# Patient Record
Sex: MALE | Race: WHITE | Employment: FULL TIME | URBAN - METROPOLITAN AREA
[De-identification: names, ages, dates, MRNs, and addresses within clinical notes are randomized per-mention and may not be internally consistent; named-entity substitution may affect disease eponyms.]

---

## 2023-02-01 ENCOUNTER — LAB ENCOUNTER (OUTPATIENT)
Dept: LAB | Age: 38
End: 2023-02-01
Attending: STUDENT IN AN ORGANIZED HEALTH CARE EDUCATION/TRAINING PROGRAM
Payer: COMMERCIAL

## 2023-02-01 ENCOUNTER — OFFICE VISIT (OUTPATIENT)
Dept: FAMILY MEDICINE CLINIC | Facility: CLINIC | Age: 38
End: 2023-02-01

## 2023-02-01 ENCOUNTER — LAB ENCOUNTER (OUTPATIENT)
Dept: LAB | Facility: HOSPITAL | Age: 38
End: 2023-02-01
Attending: STUDENT IN AN ORGANIZED HEALTH CARE EDUCATION/TRAINING PROGRAM
Payer: COMMERCIAL

## 2023-02-01 VITALS
BODY MASS INDEX: 23.48 KG/M2 | WEIGHT: 164 LBS | DIASTOLIC BLOOD PRESSURE: 71 MMHG | SYSTOLIC BLOOD PRESSURE: 130 MMHG | HEIGHT: 70 IN | HEART RATE: 87 BPM

## 2023-02-01 DIAGNOSIS — R07.89 LEFT CHEST PRESSURE: ICD-10-CM

## 2023-02-01 DIAGNOSIS — L98.9 SKIN LESION OF SCALP: ICD-10-CM

## 2023-02-01 DIAGNOSIS — Z00.00 WELL ADULT EXAM: ICD-10-CM

## 2023-02-01 DIAGNOSIS — Z00.00 WELL ADULT EXAM: Primary | ICD-10-CM

## 2023-02-01 LAB
ALBUMIN SERPL-MCNC: 4.5 G/DL (ref 3.4–5)
ALBUMIN/GLOB SERPL: 1.5 {RATIO} (ref 1–2)
ALP LIVER SERPL-CCNC: 58 U/L
ALT SERPL-CCNC: 31 U/L
ANION GAP SERPL CALC-SCNC: 2 MMOL/L (ref 0–18)
AST SERPL-CCNC: 15 U/L (ref 15–37)
ATRIAL RATE: 60 BPM
BILIRUB SERPL-MCNC: 1 MG/DL (ref 0.1–2)
BILIRUB UR QL: NEGATIVE
BUN BLD-MCNC: 12 MG/DL (ref 7–18)
BUN/CREAT SERPL: 12.2 (ref 10–20)
CALCIUM BLD-MCNC: 10.1 MG/DL (ref 8.5–10.1)
CHLORIDE SERPL-SCNC: 104 MMOL/L (ref 98–112)
CHOLEST SERPL-MCNC: 226 MG/DL (ref ?–200)
CLARITY UR: CLEAR
CO2 SERPL-SCNC: 32 MMOL/L (ref 21–32)
COLOR UR: YELLOW
CREAT BLD-MCNC: 0.98 MG/DL
DEPRECATED RDW RBC AUTO: 39.8 FL (ref 35.1–46.3)
ERYTHROCYTE [DISTWIDTH] IN BLOOD BY AUTOMATED COUNT: 12 % (ref 11–15)
EST. AVERAGE GLUCOSE BLD GHB EST-MCNC: 97 MG/DL (ref 68–126)
FASTING PATIENT LIPID ANSWER: YES
FASTING STATUS PATIENT QL REPORTED: YES
GFR SERPLBLD BASED ON 1.73 SQ M-ARVRAT: 103 ML/MIN/1.73M2 (ref 60–?)
GLOBULIN PLAS-MCNC: 3.1 G/DL (ref 2.8–4.4)
GLUCOSE BLD-MCNC: 91 MG/DL (ref 70–99)
GLUCOSE UR-MCNC: NEGATIVE MG/DL
HBA1C MFR BLD: 5 % (ref ?–5.7)
HCT VFR BLD AUTO: 45.7 %
HDLC SERPL-MCNC: 65 MG/DL (ref 40–59)
HGB BLD-MCNC: 15.5 G/DL
HGB UR QL STRIP.AUTO: NEGATIVE
KETONES UR-MCNC: NEGATIVE MG/DL
LDLC SERPL CALC-MCNC: 153 MG/DL (ref ?–100)
LEUKOCYTE ESTERASE UR QL STRIP.AUTO: NEGATIVE
MCH RBC QN AUTO: 30.8 PG (ref 26–34)
MCHC RBC AUTO-ENTMCNC: 33.9 G/DL (ref 31–37)
MCV RBC AUTO: 90.7 FL
NITRITE UR QL STRIP.AUTO: NEGATIVE
NONHDLC SERPL-MCNC: 161 MG/DL (ref ?–130)
OSMOLALITY SERPL CALC.SUM OF ELEC: 285 MOSM/KG (ref 275–295)
P AXIS: 47 DEGREES
P-R INTERVAL: 156 MS
PH UR: 7.5 [PH] (ref 5–8)
PLATELET # BLD AUTO: 190 10(3)UL (ref 150–450)
POTASSIUM SERPL-SCNC: 4.6 MMOL/L (ref 3.5–5.1)
PROT SERPL-MCNC: 7.6 G/DL (ref 6.4–8.2)
PROT UR-MCNC: NEGATIVE MG/DL
Q-T INTERVAL: 388 MS
QRS DURATION: 108 MS
QTC CALCULATION (BEZET): 388 MS
R AXIS: 83 DEGREES
RBC # BLD AUTO: 5.04 X10(6)UL
SODIUM SERPL-SCNC: 138 MMOL/L (ref 136–145)
SP GR UR STRIP: 1.01 (ref 1–1.03)
T AXIS: 70 DEGREES
TRIGL SERPL-MCNC: 48 MG/DL (ref 30–149)
TSI SER-ACNC: 1.06 MIU/ML (ref 0.36–3.74)
UROBILINOGEN UR STRIP-ACNC: 0.2
VENTRICULAR RATE: 60 BPM
VIT D+METAB SERPL-MCNC: 24.7 NG/ML (ref 30–100)
VLDLC SERPL CALC-MCNC: 9 MG/DL (ref 0–30)
WBC # BLD AUTO: 2.7 X10(3) UL (ref 4–11)

## 2023-02-01 PROCEDURE — 3075F SYST BP GE 130 - 139MM HG: CPT | Performed by: STUDENT IN AN ORGANIZED HEALTH CARE EDUCATION/TRAINING PROGRAM

## 2023-02-01 PROCEDURE — 84443 ASSAY THYROID STIM HORMONE: CPT

## 2023-02-01 PROCEDURE — 83036 HEMOGLOBIN GLYCOSYLATED A1C: CPT

## 2023-02-01 PROCEDURE — 81003 URINALYSIS AUTO W/O SCOPE: CPT

## 2023-02-01 PROCEDURE — 93005 ELECTROCARDIOGRAM TRACING: CPT

## 2023-02-01 PROCEDURE — 3078F DIAST BP <80 MM HG: CPT | Performed by: STUDENT IN AN ORGANIZED HEALTH CARE EDUCATION/TRAINING PROGRAM

## 2023-02-01 PROCEDURE — 99385 PREV VISIT NEW AGE 18-39: CPT | Performed by: STUDENT IN AN ORGANIZED HEALTH CARE EDUCATION/TRAINING PROGRAM

## 2023-02-01 PROCEDURE — 82306 VITAMIN D 25 HYDROXY: CPT

## 2023-02-01 PROCEDURE — 85027 COMPLETE CBC AUTOMATED: CPT

## 2023-02-01 PROCEDURE — 80061 LIPID PANEL: CPT

## 2023-02-01 PROCEDURE — 36415 COLL VENOUS BLD VENIPUNCTURE: CPT

## 2023-02-01 PROCEDURE — 3008F BODY MASS INDEX DOCD: CPT | Performed by: STUDENT IN AN ORGANIZED HEALTH CARE EDUCATION/TRAINING PROGRAM

## 2023-02-01 PROCEDURE — 93010 ELECTROCARDIOGRAM REPORT: CPT | Performed by: INTERNAL MEDICINE

## 2023-02-01 PROCEDURE — 80053 COMPREHEN METABOLIC PANEL: CPT

## 2023-02-14 ENCOUNTER — OFFICE VISIT (OUTPATIENT)
Dept: DERMATOLOGY CLINIC | Facility: CLINIC | Age: 38
End: 2023-02-14

## 2023-02-14 DIAGNOSIS — B07.9 VERRUCA VULGARIS: Primary | ICD-10-CM

## 2023-02-14 RX ORDER — CLOBETASOL PROPIONATE 0.5 MG/G
CREAM TOPICAL
Qty: 30 G | Refills: 2 | Status: SHIPPED | OUTPATIENT
Start: 2023-02-14

## 2023-02-14 NOTE — PROGRESS NOTES
New Patient    Referred by: No referring provider defined for this encounter. CHIEF COMPLAINT: Lesion of concern     HISTORY OF PRESENT ILLNESS: Ramy Conway is a 30year old male here for evaluation of lesion of concern. 1. Growth   Location: Scalp  Duration: Unknown  Signs and symptoms: Hard, raised and scaly  Current treatment: None  Past treatments: None    2. Growth   Location: Upper right thigh  Duration: Seasonal  Signs and symptoms: Itchy  Current treatment: None  Past treatments: Tpoical    Personal Dermatologic History  History of skin cancer: No  History of  atypical moles: No    FAMILY HISTORY:  History of melanoma: No    Past Medical History  No past medical history on file. REVIEW OF SYSTEMS:  Constitutional: Denies fever, chills, unintentional weight loss. Skin as per HPI    Medications  No current outpatient medications on file. PHYSICAL EXAM:  General: awake, alert, no acute distress  Neuropsych: appropriate mood and affect  Eyes: Sclerae anicteric, without conjunctival injection, eyelids unremarkable  Skin: Skin exam was performed today including the following: scalp and right thigh . Pertinent findings include:   - Right thigh with a pink scaly plaques  - Right temple with a verrucous pink papule     ASSESSMENT & PLAN:  Pathophysiology of diagnoses discussed with patient. Therapeutic options reviewed. Risks, benefits, and alternatives discussed with patient. Instructions reviewed at length. #Verruca Vulgaris   - Cryotherapy today. Medically necessary as lesion inflamed and irritated. - Cryosurgery of non-malignant lesion(s)  - Risks, benefits, alternatives and personnel required for cryosurgery reviewed with patient. Discussed the expected redness, as well as the risks of swelling, blistering, crusting, pigmentary changes, and permanent scarring. . Discussed that lesion may need additional treatments in future or may recur.  Pt verbalizes understanding and wishes to proceed. - Cryosurgery performed with Liquid Nitrogen via cryostat spray gun to warts. 1 lesion(s) treated. - Patient tolerated well and wound care discussed. #Eczematous dermatitis, right thigh - N/C   - clobetasol 0.05% twice daily to affected areas Monday-Friday. Take weekends off.        Return to clinic: 6 weeks or sooner if something concerning arises     Romana Jenkins, MD

## 2023-03-28 ENCOUNTER — OFFICE VISIT (OUTPATIENT)
Dept: DERMATOLOGY CLINIC | Facility: CLINIC | Age: 38
End: 2023-03-28

## 2023-03-28 DIAGNOSIS — L82.1 SEBORRHEIC KERATOSIS: Primary | ICD-10-CM

## 2023-03-28 PROCEDURE — 99212 OFFICE O/P EST SF 10 MIN: CPT | Performed by: STUDENT IN AN ORGANIZED HEALTH CARE EDUCATION/TRAINING PROGRAM

## 2024-03-04 ENCOUNTER — LAB ENCOUNTER (OUTPATIENT)
Dept: LAB | Age: 39
End: 2024-03-04
Attending: STUDENT IN AN ORGANIZED HEALTH CARE EDUCATION/TRAINING PROGRAM
Payer: COMMERCIAL

## 2024-03-04 ENCOUNTER — OFFICE VISIT (OUTPATIENT)
Dept: FAMILY MEDICINE CLINIC | Facility: CLINIC | Age: 39
End: 2024-03-04
Payer: COMMERCIAL

## 2024-03-04 VITALS
BODY MASS INDEX: 24.22 KG/M2 | DIASTOLIC BLOOD PRESSURE: 60 MMHG | SYSTOLIC BLOOD PRESSURE: 97 MMHG | WEIGHT: 169.19 LBS | HEART RATE: 87 BPM | TEMPERATURE: 99 F | HEIGHT: 70 IN | OXYGEN SATURATION: 98 %

## 2024-03-04 DIAGNOSIS — E55.9 VITAMIN D DEFICIENCY: ICD-10-CM

## 2024-03-04 DIAGNOSIS — Z00.00 WELL ADULT EXAM: Primary | ICD-10-CM

## 2024-03-04 DIAGNOSIS — E78.5 HYPERLIPIDEMIA, UNSPECIFIED HYPERLIPIDEMIA TYPE: ICD-10-CM

## 2024-03-04 DIAGNOSIS — Z00.00 WELL ADULT EXAM: ICD-10-CM

## 2024-03-04 DIAGNOSIS — R94.31 ABNORMAL EKG: ICD-10-CM

## 2024-03-04 DIAGNOSIS — R94.31 NONSPECIFIC ABNORMAL ELECTROCARDIOGRAM (ECG) (EKG): Primary | ICD-10-CM

## 2024-03-04 LAB
ALBUMIN SERPL-MCNC: 4.7 G/DL (ref 3.2–4.8)
ALBUMIN/GLOB SERPL: 1.8 {RATIO} (ref 1–2)
ALP LIVER SERPL-CCNC: 65 U/L
ALT SERPL-CCNC: 25 U/L
ANION GAP SERPL CALC-SCNC: 5 MMOL/L (ref 0–18)
AST SERPL-CCNC: 18 U/L (ref ?–34)
ATRIAL RATE: 65 BPM
BILIRUB SERPL-MCNC: 0.6 MG/DL (ref 0.3–1.2)
BILIRUB UR QL: NEGATIVE
BUN BLD-MCNC: 11 MG/DL (ref 9–23)
BUN/CREAT SERPL: 12.5 (ref 10–20)
CALCIUM BLD-MCNC: 9.9 MG/DL (ref 8.7–10.4)
CHLORIDE SERPL-SCNC: 107 MMOL/L (ref 98–112)
CHOLEST SERPL-MCNC: 203 MG/DL (ref ?–200)
CLARITY UR: CLEAR
CO2 SERPL-SCNC: 29 MMOL/L (ref 21–32)
CREAT BLD-MCNC: 0.88 MG/DL
DEPRECATED RDW RBC AUTO: 39.5 FL (ref 35.1–46.3)
EGFRCR SERPLBLD CKD-EPI 2021: 114 ML/MIN/1.73M2 (ref 60–?)
ERYTHROCYTE [DISTWIDTH] IN BLOOD BY AUTOMATED COUNT: 12.1 % (ref 11–15)
EST. AVERAGE GLUCOSE BLD GHB EST-MCNC: 91 MG/DL (ref 68–126)
FASTING PATIENT LIPID ANSWER: YES
FASTING STATUS PATIENT QL REPORTED: YES
GLOBULIN PLAS-MCNC: 2.6 G/DL (ref 2.8–4.4)
GLUCOSE BLD-MCNC: 90 MG/DL (ref 70–99)
GLUCOSE UR-MCNC: NORMAL MG/DL
HBA1C MFR BLD: 4.8 % (ref ?–5.7)
HCT VFR BLD AUTO: 44.7 %
HDLC SERPL-MCNC: 50 MG/DL (ref 40–59)
HGB BLD-MCNC: 15.2 G/DL
HGB UR QL STRIP.AUTO: NEGATIVE
KETONES UR-MCNC: NEGATIVE MG/DL
LDLC SERPL CALC-MCNC: 147 MG/DL (ref ?–100)
LEUKOCYTE ESTERASE UR QL STRIP.AUTO: NEGATIVE
MCH RBC QN AUTO: 30.2 PG (ref 26–34)
MCHC RBC AUTO-ENTMCNC: 34 G/DL (ref 31–37)
MCV RBC AUTO: 88.9 FL
NITRITE UR QL STRIP.AUTO: NEGATIVE
NONHDLC SERPL-MCNC: 153 MG/DL (ref ?–130)
OSMOLALITY SERPL CALC.SUM OF ELEC: 291 MOSM/KG (ref 275–295)
P AXIS: 73 DEGREES
P-R INTERVAL: 172 MS
PH UR: 7 [PH] (ref 5–8)
PLATELET # BLD AUTO: 208 10(3)UL (ref 150–450)
POTASSIUM SERPL-SCNC: 4.3 MMOL/L (ref 3.5–5.1)
PROT SERPL-MCNC: 7.3 G/DL (ref 5.7–8.2)
PROT UR-MCNC: NEGATIVE MG/DL
Q-T INTERVAL: 378 MS
QRS DURATION: 108 MS
QTC CALCULATION (BEZET): 393 MS
R AXIS: 82 DEGREES
RBC # BLD AUTO: 5.03 X10(6)UL
SODIUM SERPL-SCNC: 141 MMOL/L (ref 136–145)
SP GR UR STRIP: 1.01 (ref 1–1.03)
T AXIS: 71 DEGREES
TRIGL SERPL-MCNC: 31 MG/DL (ref 30–149)
TSI SER-ACNC: 1.06 MIU/ML (ref 0.55–4.78)
UROBILINOGEN UR STRIP-ACNC: NORMAL
VENTRICULAR RATE: 65 BPM
VIT D+METAB SERPL-MCNC: 33.4 NG/ML (ref 30–100)
VLDLC SERPL CALC-MCNC: 6 MG/DL (ref 0–30)
WBC # BLD AUTO: 3.1 X10(3) UL (ref 4–11)

## 2024-03-04 PROCEDURE — 36415 COLL VENOUS BLD VENIPUNCTURE: CPT

## 2024-03-04 PROCEDURE — 80061 LIPID PANEL: CPT

## 2024-03-04 PROCEDURE — 99395 PREV VISIT EST AGE 18-39: CPT | Performed by: STUDENT IN AN ORGANIZED HEALTH CARE EDUCATION/TRAINING PROGRAM

## 2024-03-04 PROCEDURE — 3074F SYST BP LT 130 MM HG: CPT | Performed by: STUDENT IN AN ORGANIZED HEALTH CARE EDUCATION/TRAINING PROGRAM

## 2024-03-04 PROCEDURE — 85027 COMPLETE CBC AUTOMATED: CPT

## 2024-03-04 PROCEDURE — 83036 HEMOGLOBIN GLYCOSYLATED A1C: CPT

## 2024-03-04 PROCEDURE — 93010 ELECTROCARDIOGRAM REPORT: CPT | Performed by: INTERNAL MEDICINE

## 2024-03-04 PROCEDURE — 84443 ASSAY THYROID STIM HORMONE: CPT

## 2024-03-04 PROCEDURE — 3078F DIAST BP <80 MM HG: CPT | Performed by: STUDENT IN AN ORGANIZED HEALTH CARE EDUCATION/TRAINING PROGRAM

## 2024-03-04 PROCEDURE — 93005 ELECTROCARDIOGRAM TRACING: CPT

## 2024-03-04 PROCEDURE — 3008F BODY MASS INDEX DOCD: CPT | Performed by: STUDENT IN AN ORGANIZED HEALTH CARE EDUCATION/TRAINING PROGRAM

## 2024-03-04 PROCEDURE — 82306 VITAMIN D 25 HYDROXY: CPT

## 2024-03-04 PROCEDURE — 81003 URINALYSIS AUTO W/O SCOPE: CPT

## 2024-03-04 PROCEDURE — 80053 COMPREHEN METABOLIC PANEL: CPT

## 2024-03-04 NOTE — PROGRESS NOTES
HPI:    Patient ID: Alvarado Peterson is a 38 year old male.    HPI  Pt presenting for routine physical exam. Denies any acute issues or recent illnesses. No significant chronic medical problems. Past medical/surgical history, family history, and social history were reviewed.     H/o prior abnormal EKG  H/o HLD  Denies chest pain, palpitations, dizziness, limitation with activity    Mood stable, denies SH/SI/HI    Review of Systems   A comprehensive 10 point review of systems was completed.  Pertinent positives and negatives noted in the the HPI.       Current Outpatient Medications   Medication Sig Dispense Refill    clobetasol 0.05 % External Cream Apply twice daily to affected areas on right thigh Monday-Friday. Take weekends off. 30 g 2     Allergies:No Known Allergies   Vitals:    03/04/24 0955   BP: 97/60   Pulse: 87   Temp: 98.5 °F (36.9 °C)   TempSrc: Temporal   SpO2: 98%   Weight: 169 lb 3.2 oz (76.7 kg)   Height: 5' 10\" (1.778 m)       Body mass index is 24.28 kg/m².   PHYSICAL EXAM:   Physical Exam  Vitals reviewed.   Constitutional:       General: He is not in acute distress.     Appearance: Normal appearance.   HENT:      Head: Normocephalic and atraumatic.      Right Ear: External ear normal.      Left Ear: External ear normal.   Eyes:      Conjunctiva/sclera: Conjunctivae normal.   Cardiovascular:      Rate and Rhythm: Normal rate and regular rhythm.      Heart sounds: Normal heart sounds, S1 normal and S2 normal. No murmur heard.  Pulmonary:      Effort: Pulmonary effort is normal. No respiratory distress.      Breath sounds: Normal breath sounds. No wheezing, rhonchi or rales.   Abdominal:      General: Bowel sounds are normal.      Palpations: Abdomen is soft.      Tenderness: There is no abdominal tenderness. There is no guarding or rebound.   Musculoskeletal:      Cervical back: Normal range of motion and neck supple. No muscular tenderness.      Right lower leg: No edema.      Left lower leg: No  edema.   Lymphadenopathy:      Cervical: No cervical adenopathy.   Skin:     General: Skin is warm.      Coloration: Skin is not jaundiced.   Neurological:      General: No focal deficit present.      Mental Status: He is alert and oriented to person, place, and time. Mental status is at baseline.   Psychiatric:         Attention and Perception: Attention normal.         Mood and Affect: Mood normal.         Behavior: Behavior normal. Behavior is cooperative.         Cognition and Memory: Cognition normal.             ASSESSMENT/PLAN:   1. Well adult exam  Discussed preventative screenings  - will check labs as below  - encouraged to continue diet/exercise for overall wellness  - follow-up with eye doctor annually  - follow-up with dentist every 6 months  - return yearly for physicals  - annual flu shot  - tetanus booster every 10yrs  - TSH W Reflex To Free T4; Future  - CBC, Platelet; No Differential; Future  - Comp Metabolic Panel (14); Future  - Hemoglobin A1C; Future  - Lipid Panel; Future  - Urinalysis, Routine; Future    2. Hyperlipidemia, unspecified hyperlipidemia type  - discussed healthy diet and lifestyle changes for overall wellness, which includes decreased carb and sugar intake, increased fiber intake, and increased water intake as tolerated, as well as regular exercise    3. Vitamin D deficiency  - Vitamin D; Future    4. Abnormal EKG  H/o incomplete RBBB  Will check for surveillance  - EKG 12 Lead    Pt verbalized understanding and agrees with plan.    Orders Placed This Encounter   Procedures    TSH W Reflex To Free T4    CBC, Platelet; No Differential    Comp Metabolic Panel (14)    Hemoglobin A1C    Lipid Panel    Vitamin D    Urinalysis, Routine       Meds This Visit:  Requested Prescriptions      No prescriptions requested or ordered in this encounter       Imaging & Referrals:  EKG 12-LEAD         ID#[id number]

## 2024-03-08 ENCOUNTER — PATIENT MESSAGE (OUTPATIENT)
Dept: FAMILY MEDICINE CLINIC | Facility: CLINIC | Age: 39
End: 2024-03-08

## 2024-03-08 DIAGNOSIS — D72.819 LEUKOPENIA, UNSPECIFIED TYPE: Primary | ICD-10-CM

## 2024-03-09 NOTE — TELEPHONE ENCOUNTER
Dr. Brito please see patient message. I pasted below recent test results below.    Your electrolytes, kidney function, liver function, and thyroid function are normal.  You do not have diabetes. No sign of infection or anemia. Your white blood cell count is still mildly decreased, although improved from previous. We will plan to recheck in 6 months for close surveillance.  Your lipid panel is abnormal with elevated cholesterol and LDL, although improved from last year. Continue healthy diet and lifestyle changes for wellness.  Please call with any questions.   Written by Eloisa Brito MD on 3/7/2024 12:57 PM CST  Seen by patient Alvarado Peterson on 3/8/2024  3:40 PM

## 2024-03-26 ENCOUNTER — TELEPHONE (OUTPATIENT)
Dept: HEMATOLOGY/ONCOLOGY | Facility: HOSPITAL | Age: 39
End: 2024-03-26

## 2024-03-26 NOTE — TELEPHONE ENCOUNTER
Please call patient to schedule a consult with Dr Dasilva.  Referred by Dr Brito.  DX- Low white blood cell count

## 2024-03-28 ENCOUNTER — OFFICE VISIT (OUTPATIENT)
Dept: HEMATOLOGY/ONCOLOGY | Facility: HOSPITAL | Age: 39
End: 2024-03-28
Attending: INTERNAL MEDICINE
Payer: COMMERCIAL

## 2024-03-28 ENCOUNTER — LAB ENCOUNTER (OUTPATIENT)
Dept: LAB | Facility: HOSPITAL | Age: 39
End: 2024-03-28
Attending: INTERNAL MEDICINE
Payer: COMMERCIAL

## 2024-03-28 VITALS
RESPIRATION RATE: 16 BRPM | BODY MASS INDEX: 22.84 KG/M2 | DIASTOLIC BLOOD PRESSURE: 28 MMHG | OXYGEN SATURATION: 98 % | WEIGHT: 163.13 LBS | TEMPERATURE: 99 F | SYSTOLIC BLOOD PRESSURE: 119 MMHG | HEIGHT: 70.87 IN | HEART RATE: 74 BPM

## 2024-03-28 DIAGNOSIS — D72.819 LEUKOPENIA, UNSPECIFIED TYPE: ICD-10-CM

## 2024-03-28 DIAGNOSIS — D72.819 LEUKOPENIA, UNSPECIFIED TYPE: Primary | ICD-10-CM

## 2024-03-28 LAB
BASOPHILS # BLD AUTO: 0.02 X10(3) UL (ref 0–0.2)
BASOPHILS NFR BLD AUTO: 0.4 %
DEPRECATED RDW RBC AUTO: 38.4 FL (ref 35.1–46.3)
EOSINOPHIL # BLD AUTO: 0.05 X10(3) UL (ref 0–0.7)
EOSINOPHIL NFR BLD AUTO: 1.1 %
ERYTHROCYTE [DISTWIDTH] IN BLOOD BY AUTOMATED COUNT: 11.9 % (ref 11–15)
FOLATE SERPL-MCNC: >24 NG/ML (ref 5.4–?)
HBV CORE AB SERPL QL IA: NONREACTIVE
HBV SURFACE AB SER QL: NONREACTIVE
HBV SURFACE AB SERPL IA-ACNC: <3.1 MIU/ML
HBV SURFACE AG SER-ACNC: 0.17 [IU]/L
HBV SURFACE AG SERPL QL IA: NONREACTIVE
HCT VFR BLD AUTO: 44.2 %
HCV AB SERPL QL IA: NONREACTIVE
HGB BLD-MCNC: 15.2 G/DL
IGA SERPL-MCNC: 152.3 MG/DL (ref 40–350)
IGM SERPL-MCNC: 59.4 MG/DL (ref 50–300)
IMM GRANULOCYTES # BLD AUTO: 0.01 X10(3) UL (ref 0–1)
IMM GRANULOCYTES NFR BLD: 0.2 %
IMMUNOGLOBULIN PNL SER-MCNC: 994 MG/DL (ref 650–1600)
LYMPHOCYTES # BLD AUTO: 1.2 X10(3) UL (ref 1–4)
LYMPHOCYTES NFR BLD AUTO: 25.9 %
MCH RBC QN AUTO: 30.5 PG (ref 26–34)
MCHC RBC AUTO-ENTMCNC: 34.4 G/DL (ref 31–37)
MCV RBC AUTO: 88.6 FL
MONOCYTES # BLD AUTO: 0.24 X10(3) UL (ref 0.1–1)
MONOCYTES NFR BLD AUTO: 5.2 %
NEUTROPHILS # BLD AUTO: 3.11 X10 (3) UL (ref 1.5–7.7)
NEUTROPHILS # BLD AUTO: 3.11 X10(3) UL (ref 1.5–7.7)
NEUTROPHILS NFR BLD AUTO: 67.2 %
PLATELET # BLD AUTO: 181 10(3)UL (ref 150–450)
RBC # BLD AUTO: 4.99 X10(6)UL
VIT B12 SERPL-MCNC: 500 PG/ML (ref 211–911)
WBC # BLD AUTO: 4.6 X10(3) UL (ref 4–11)

## 2024-03-28 PROCEDURE — 88189 FLOWCYTOMETRY/READ 16 & >: CPT

## 2024-03-28 PROCEDURE — 87389 HIV-1 AG W/HIV-1&-2 AB AG IA: CPT

## 2024-03-28 PROCEDURE — 99204 OFFICE O/P NEW MOD 45 MIN: CPT | Performed by: INTERNAL MEDICINE

## 2024-03-28 PROCEDURE — 82607 VITAMIN B-12: CPT

## 2024-03-28 PROCEDURE — 86704 HEP B CORE ANTIBODY TOTAL: CPT

## 2024-03-28 PROCEDURE — 87340 HEPATITIS B SURFACE AG IA: CPT

## 2024-03-28 PROCEDURE — 86334 IMMUNOFIX E-PHORESIS SERUM: CPT

## 2024-03-28 PROCEDURE — 36415 COLL VENOUS BLD VENIPUNCTURE: CPT

## 2024-03-28 PROCEDURE — 82784 ASSAY IGA/IGD/IGG/IGM EACH: CPT

## 2024-03-28 PROCEDURE — 86225 DNA ANTIBODY NATIVE: CPT

## 2024-03-28 PROCEDURE — 86803 HEPATITIS C AB TEST: CPT

## 2024-03-28 PROCEDURE — 84165 PROTEIN E-PHORESIS SERUM: CPT

## 2024-03-28 PROCEDURE — 86706 HEP B SURFACE ANTIBODY: CPT

## 2024-03-28 PROCEDURE — 88185 FLOWCYTOMETRY/TC ADD-ON: CPT

## 2024-03-28 PROCEDURE — 83521 IG LIGHT CHAINS FREE EACH: CPT

## 2024-03-28 PROCEDURE — 80503 PATH CLIN CONSLTJ SF 5-20: CPT

## 2024-03-28 PROCEDURE — 86038 ANTINUCLEAR ANTIBODIES: CPT

## 2024-03-28 PROCEDURE — 85025 COMPLETE CBC W/AUTO DIFF WBC: CPT

## 2024-03-28 PROCEDURE — 88184 FLOWCYTOMETRY/ TC 1 MARKER: CPT

## 2024-03-28 PROCEDURE — 82746 ASSAY OF FOLIC ACID SERUM: CPT

## 2024-03-28 NOTE — CONSULTS
Hematology Oncology Consultation Note    Patient Name: Alvarado Peterson   YOB: 1985   Medical Record Number: [id number]   CSN: [id number]   Consulting Physician: Tonia Dasilva MD  Referring Physician(s): Eloisa Brito MD   Date of Consultation: 3/28/2024     Reason for Consultation:    Encounter Diagnoses   Name Primary?    Leukopenia, unspecified type Yes       History of Present Illness:   Alvarado Petersonis a 38 year old White male with no medical history referred for evaluation of leukopenia. Routine labs in 3/2024 showed WBC 3.1, no diff, normal Hgb and Plt count. WBC 2.7 in 2023. No prior labs available. He feels well and has no complaints. No recurrent or chronic infections. No mouth sores, gingivitis or URI. He is not taking medications. Drinks ETOH socially about once a week. No drug use. No history of hepatitis or liver disease. No history of autoimmune. No family history of malignancy or blood disorder.       Past Medical History:  History reviewed. No pertinent past medical history.    Past Surgical History:  History reviewed. No pertinent surgical history.    Family Medical History:  History reviewed. No pertinent family history.    Psychosocial History:  Social History     Socioeconomic History    Marital status:      Spouse name: Not on file    Number of children: Not on file    Years of education: Not on file    Highest education level: Not on file   Occupational History    Not on file   Tobacco Use    Smoking status: Former     Packs/day: .5     Types: Cigarettes, Pipe    Smokeless tobacco: Never   Vaping Use    Vaping Use: Never used   Substance and Sexual Activity    Alcohol use: Yes     Alcohol/week: 2.0 standard drinks of alcohol     Types: 1 Glasses of wine, 1 Cans of beer per week     Comment: soccially    Drug use: Never    Sexual activity: Not on file   Other Topics Concern    Grew up on a farm Not Asked    History of tanning Not Asked    Outdoor occupation Not  Asked    Reaction to local anesthetic No    Pt has a pacemaker Not Asked    Pt has a defibrillator Not Asked   Social History Narrative    Not on file     Social Determinants of Health     Financial Resource Strain: Not on file   Food Insecurity: Not on file   Transportation Needs: Not on file   Physical Activity: Not on file   Stress: Not on file   Social Connections: Not on file   Housing Stability: Not on file       Allergies:   No Known Allergies    Current Medications:    Current Outpatient Medications:     clobetasol 0.05 % External Cream, Apply twice daily to affected areas on right thigh Monday-Friday. Take weekends off., Disp: 30 g, Rfl: 2    Review of Systems:  A comprehensive 10-point ROS completed all negative unless otherwise documented in HPI.     Vital Signs:  BP (!) 119/28 (BP Location: Right arm, Patient Position: Sitting, Cuff Size: adult)   Pulse 74   Temp 98.5 °F (36.9 °C) (Oral)   Resp 16   Ht 1.8 m (5' 10.87\")   Wt 74 kg (163 lb 1.6 oz)   SpO2 98%   BMI 22.83 kg/m²     Physical Examination:  General: Alert and oriented x 3, not in acute distress.  HEENT: Non-icteric sclera. Oropharynx is clear.   Neck: No palpable lymphadenopathy. Neck is supple.  Chest: Clear to auscultation.  Heart: Regular rate and rhythm. S1 S2 normal.   Abdomen: Soft, non tender, non distended with good bowel sounds.  Extremities: Pedal pulses are present. No edema.  Neurological: Grossly intact.   Lymphatics: No palpable lymphadenopathy in the cervical, supraclavicular, axillary, or inguinal regions.  Psych/Depression: Appropriate mood and affect.     Laboratory:    Lab Results   Component Value Date    WBC 3.1 (L) 03/04/2024    RBC 5.03 03/04/2024    HGB 15.2 03/04/2024    HCT 44.7 03/04/2024    MCV 88.9 03/04/2024    MCH 30.2 03/04/2024    MCHC 34.0 03/04/2024    RDW 12.1 03/04/2024    .0 03/04/2024     Lab Results   Component Value Date     03/04/2024    K 4.3 03/04/2024     03/04/2024    CO2  29.0 03/04/2024    BUN 11 03/04/2024    CREATSERUM 0.88 03/04/2024    GLU 90 03/04/2024    CA 9.9 03/04/2024    ALKPHO 65 03/04/2024    ALT 25 03/04/2024    AST 18 03/04/2024    BILT 0.6 03/04/2024    ALB 4.7 03/04/2024    TP 7.3 03/04/2024     Lab Results   Component Value Date/Time    WBC 3.1 (L) 03/04/2024 10:38 AM    WBC 2.7 (L) 02/01/2023 10:11 AM     Impression & Plan:    Mild chronic isolated leukopenia (no diff) with normal Hgb and Plt count, noted since 2023 with no history of infections.   I reviewed his labs in details and discussed general causes of leukopenia including but not limited to ETOH use, acute or chronic infection (HIV, HCV) inflammatory conditions (SLE, RA etc) nutritional deficiencies (copper, B12, folate) , medication side effects, familial causes such as benign ethnic neutropenia, and more sinister causes such as bone marrow failure, or malignancies.  Patient is asymptomatic which is re-assuring.  Will proceed with the following work up and patient will return in one week to discuss results: CBC, DIFF, B12, Folate, HIV, Hepatitis, COLLIN screen, RAINA, Quant Igs, Leukemia flow cytometry.        Thank you for the opportunity to participate in the care of Alvarado Peterson. Please contact me for any questions or concerns.       Tonia Dasilva MD  Hematology Oncology

## 2024-03-29 LAB
CD10 CELLS NFR SPEC: <1 %
CD10/CD19: <1 %
CD19 CELLS NFR SPEC: 12 %
CD19+/CD200+: 4 %
CD2 CELLS NFR SPEC: 81 %
CD20 CELLS NFR SPEC: 11 %
CD200 CELLS: 7 %
CD3 CELLS NFR SPEC: 71 %
CD3+/TCRGD+: 2 %
CD3+CD4+ CELLS NFR SPEC: 45 %
CD3+CD4+ CELLS/CD3+CD8+ CLL SPEC: 1.8
CD3+CD8+ CELLS NFR SPEC: 25 %
CD3-/CD56+: 15 %
CD34 CELLS NFR SPEC: <1 %
CD38 CELLS NFR SPEC: 6 %
CD38+/CD19+: <1 %
CD45 CELLS NFR SPEC: 100 %
CD5 CELLS NFR SPEC: 72 %
CD5/CD19 CELLS: 1 %
CD7 CELLS NFR SPEC: 82 %
CELL SURF KAPPA/LAMBDA RATIO: 1.8
CELL SURF LAMBDA LIGHT CHAIN: 4 %
CELL SURFACE KAPPA LIGHT CHAIN: 7 %
DSDNA IGG SERPL IA-ACNC: 2 IU/ML
ENA AB SER QL IA: 0.2 UG/L
ENA AB SER QL IA: NEGATIVE
TCR G-D CELLS NFR SPEC: 2 %

## 2024-04-01 LAB
ALBUMIN SERPL ELPH-MCNC: 4.91 G/DL (ref 3.75–5.21)
ALBUMIN/GLOB SERPL: 2.05 {RATIO} (ref 1–2)
ALPHA1 GLOB SERPL ELPH-MCNC: 0.24 G/DL (ref 0.19–0.46)
ALPHA2 GLOB SERPL ELPH-MCNC: 0.55 G/DL (ref 0.48–1.05)
B-GLOBULIN SERPL ELPH-MCNC: 0.7 G/DL (ref 0.68–1.23)
GAMMA GLOB SERPL ELPH-MCNC: 0.91 G/DL (ref 0.62–1.7)
KAPPA LC FREE SER-MCNC: 1.1 MG/DL (ref 0.33–1.94)
KAPPA LC FREE/LAMBDA FREE SER NEPH: 1.36 {RATIO} (ref 0.26–1.65)
LAMBDA LC FREE SERPL-MCNC: 0.81 MG/DL (ref 0.57–2.63)
PROT SERPL-MCNC: 7.3 G/DL (ref 5.7–8.2)

## 2024-04-05 ENCOUNTER — VIRTUAL PHONE E/M (OUTPATIENT)
Dept: HEMATOLOGY/ONCOLOGY | Facility: HOSPITAL | Age: 39
End: 2024-04-05
Attending: INTERNAL MEDICINE
Payer: COMMERCIAL

## 2024-04-05 DIAGNOSIS — D72.819 LEUKOPENIA, UNSPECIFIED TYPE: Primary | ICD-10-CM

## 2024-04-05 DIAGNOSIS — D72.9 ABNORMAL WHITE BLOOD CELL: ICD-10-CM

## 2024-04-05 PROCEDURE — 99213 OFFICE O/P EST LOW 20 MIN: CPT | Performed by: INTERNAL MEDICINE

## 2024-04-05 NOTE — PROGRESS NOTES
Hematology Oncology Telephone Visit Note    Patient Name: Alvarado Peterson   YOB: 1985   Medical Record Number: [id number]   CSN: [id number]   Attending Physician: Tonia Dasilva MD     Date of Visit: 4/5/2024     Duration of Service: 5 min    Chief Complaint:  Follow up on neutropenia       History of Present Illness:  Alvarado Peterson is a 38 year old male here today for a telemedicine visit to discuss his lab results.   Feels well and denies any complaints. No acute issues since last visit. No infections.    Current Medications:    Current Outpatient Medications:     clobetasol 0.05 % External Cream, Apply twice daily to affected areas on right thigh Monday-Friday. Take weekends off., Disp: 30 g, Rfl: 2       Review of Systems:  10 -point systems reviewed, all negative except as mentioned in the HPI/interval history.     Laboratory:  Lab Results   Component Value Date    WBC 4.6 03/28/2024    RBC 4.99 03/28/2024    HGB 15.2 03/28/2024    HCT 44.2 03/28/2024    MCV 88.6 03/28/2024    MCH 30.5 03/28/2024    MCHC 34.4 03/28/2024    RDW 11.9 03/28/2024    .0 03/28/2024     Lab Results   Component Value Date/Time    NE 3.11 03/28/2024 02:51 PM     Lab Results   Component Value Date     03/04/2024    K 4.3 03/04/2024     03/04/2024    CO2 29.0 03/04/2024    BUN 11 03/04/2024    CREATSERUM 0.88 03/04/2024    GLU 90 03/04/2024    CA 9.9 03/04/2024    ALKPHO 65 03/04/2024    ALT 25 03/04/2024    AST 18 03/04/2024    BILT 0.6 03/04/2024    ALB 4.91 03/28/2024    TP 7.3 03/28/2024     Lab Results   Component Value Date/Time    B12 500 03/28/2024 02:51 PM     Lab Results   Component Value Date/Time    FOLIC >24.0 03/28/2024 02:51 PM       Impression and Plan:    38 year old male presenting with mild chronic isolated leukopenia (no diff), asymptomatic with no other cytopenia or history of recurrent infections or related illness.   Repeat labs in clinic with normal CBC and differential. Work up  showed normal B12, folic acid, and SPEP.  HIV, HCV and HBV testing negative. COLLIN screen panel negative.   Flow cytometry showed a small subpopulation of NK cells with decreased CD2 expression(non specific and likely insignificant).  His prior leukopenia is probably constitutional/benign or normal variant. Patient is asymptomatic which is reassuring.    Recommend continued observation with periodic labs.     Abnormal NK cells on flow cytometry:  Given the small size of this subpopulation, the significant of this finding is unclear, and this may represent a small transient clone vs a reactive population.   Flow showed no evidence of an atypical or monoclonal B-cell population, phenotypically aberrant T-cell population or increased blast population.   Will repeat in 6 months to ensure stability/or resolution of this population.       RTC 6 months with labs   Orders Placed This Encounter   Procedures    CBC With Differential With Platelet    LEUKEMIA LYMPHOMA FLOW BLOOD [E]          Tonia Dasilva MD   Hematology Oncology   [facility]

## 2024-04-08 ENCOUNTER — TELEPHONE (OUTPATIENT)
Dept: HEMATOLOGY/ONCOLOGY | Facility: HOSPITAL | Age: 39
End: 2024-04-08

## 2024-04-08 NOTE — TELEPHONE ENCOUNTER
----- Message from Gabrielle Evans RN sent at 2024  4:23 PM CDT -----  Regardin month follow up  Please schedule patient for a 6 month follow up with Dr. Dasilva - patient should have labs drawn a few days before follow up visit.    Thanks

## 2024-09-17 ENCOUNTER — APPOINTMENT (OUTPATIENT)
Dept: HEMATOLOGY/ONCOLOGY | Facility: HOSPITAL | Age: 39
End: 2024-09-17
Attending: INTERNAL MEDICINE
Payer: COMMERCIAL

## 2024-09-24 ENCOUNTER — OFFICE VISIT (OUTPATIENT)
Dept: HEMATOLOGY/ONCOLOGY | Facility: HOSPITAL | Age: 39
End: 2024-09-24
Attending: INTERNAL MEDICINE
Payer: COMMERCIAL

## 2024-09-24 VITALS
HEART RATE: 72 BPM | SYSTOLIC BLOOD PRESSURE: 120 MMHG | OXYGEN SATURATION: 99 % | BODY MASS INDEX: 22.93 KG/M2 | WEIGHT: 163.81 LBS | RESPIRATION RATE: 16 BRPM | DIASTOLIC BLOOD PRESSURE: 75 MMHG | TEMPERATURE: 98 F | HEIGHT: 70.87 IN

## 2024-09-24 DIAGNOSIS — D72.819 LEUKOPENIA, UNSPECIFIED TYPE: Primary | ICD-10-CM

## 2024-09-24 DIAGNOSIS — D72.9 ABNORMAL WHITE BLOOD CELL: ICD-10-CM

## 2024-09-24 DIAGNOSIS — D72.819 LEUKOPENIA, UNSPECIFIED TYPE: ICD-10-CM

## 2024-09-24 LAB
BASOPHILS # BLD AUTO: 0.03 X10(3) UL (ref 0–0.2)
BASOPHILS NFR BLD AUTO: 0.8 %
DEPRECATED RDW RBC AUTO: 39.1 FL (ref 35.1–46.3)
EOSINOPHIL # BLD AUTO: 0.09 X10(3) UL (ref 0–0.7)
EOSINOPHIL NFR BLD AUTO: 2.3 %
ERYTHROCYTE [DISTWIDTH] IN BLOOD BY AUTOMATED COUNT: 12.1 % (ref 11–15)
HCT VFR BLD AUTO: 45.2 %
HGB BLD-MCNC: 15.8 G/DL
IMM GRANULOCYTES # BLD AUTO: 0.01 X10(3) UL (ref 0–1)
IMM GRANULOCYTES NFR BLD: 0.3 %
LYMPHOCYTES # BLD AUTO: 1.61 X10(3) UL (ref 1–4)
LYMPHOCYTES NFR BLD AUTO: 41.5 %
MCH RBC QN AUTO: 30.6 PG (ref 26–34)
MCHC RBC AUTO-ENTMCNC: 35 G/DL (ref 31–37)
MCV RBC AUTO: 87.4 FL
MONOCYTES # BLD AUTO: 0.41 X10(3) UL (ref 0.1–1)
MONOCYTES NFR BLD AUTO: 10.6 %
NEUTROPHILS # BLD AUTO: 1.73 X10 (3) UL (ref 1.5–7.7)
NEUTROPHILS # BLD AUTO: 1.73 X10(3) UL (ref 1.5–7.7)
NEUTROPHILS NFR BLD AUTO: 44.5 %
PLATELET # BLD AUTO: 178 10(3)UL (ref 150–450)
RBC # BLD AUTO: 5.17 X10(6)UL
WBC # BLD AUTO: 3.9 X10(3) UL (ref 4–11)

## 2024-09-24 PROCEDURE — G2211 COMPLEX E/M VISIT ADD ON: HCPCS | Performed by: INTERNAL MEDICINE

## 2024-09-24 PROCEDURE — 36415 COLL VENOUS BLD VENIPUNCTURE: CPT

## 2024-09-24 PROCEDURE — 88185 FLOWCYTOMETRY/TC ADD-ON: CPT

## 2024-09-24 PROCEDURE — 88189 FLOWCYTOMETRY/READ 16 & >: CPT

## 2024-09-24 PROCEDURE — 99213 OFFICE O/P EST LOW 20 MIN: CPT | Performed by: INTERNAL MEDICINE

## 2024-09-24 PROCEDURE — 88184 FLOWCYTOMETRY/ TC 1 MARKER: CPT

## 2024-09-24 PROCEDURE — 85025 COMPLETE CBC W/AUTO DIFF WBC: CPT

## 2024-09-24 NOTE — PROGRESS NOTES
Hematology Oncology Progress Note    Patient Name: Alvarado Peterson   YOB: 1985   Medical Record Number: [id number]   CSN: [id number]   Attending Physician: Tonia Dasilva MD     Date of Visit: 9/24/2024     Chief Complaint:  Follow up on neutropenia     History of Present Illness:  Alvarado Peterson is a 38 year old male here today for follow up and review of labs.  Feels well and denies any complaints. No acute issues since last visit.  No infections, fevers, night sweats, weight loss, fatigue, enlarged nodes.     Current Medications:    Current Outpatient Medications:     clobetasol 0.05 % External Cream, Apply twice daily to affected areas on right thigh Monday-Friday. Take weekends off., Disp: 30 g, Rfl: 2       Review of Systems:  10 -point systems reviewed, all negative except as mentioned in the HPI/interval history.     Vital Signs:   /75 (BP Location: Right arm, Patient Position: Sitting, Cuff Size: adult)   Pulse 72   Temp 98 °F (36.7 °C) (Oral)   Resp 16   Ht 1.8 m (5' 10.87\")   Wt 74.3 kg (163 lb 12.8 oz)   SpO2 99%   BMI 22.93 kg/m²     Physical Exam:  General: Patient is alert and oriented x 3, not in acute distress.  HEENT: Oropharynx is clear.   Neck: No palpable lymphadenopathy. Neck is supple.  Chest: non labored breathing.   Heart: Regular rate and rhythm.   Abdomen: Soft, non distended   Extremities: No edema.  Neurological: Grossly intact.     Laboratory:  Lab Results   Component Value Date    WBC 3.9 (L) 09/24/2024    RBC 5.17 09/24/2024    HGB 15.8 09/24/2024    HCT 45.2 09/24/2024    MCV 87.4 09/24/2024    MCH 30.6 09/24/2024    MCHC 35.0 09/24/2024    RDW 12.1 09/24/2024    .0 09/24/2024     Lab Results   Component Value Date/Time    WBC 3.9 (L) 09/24/2024 07:51 AM    WBC 4.6 03/28/2024 02:51 PM    WBC 3.1 (L) 03/04/2024 10:38 AM    WBC 2.7 (L) 02/01/2023 10:11 AM     Lab Results   Component Value Date/Time    NE 1.73 09/24/2024 07:51 AM    NE 3.11 03/28/2024  02:51 PM     Lab Results   Component Value Date     03/04/2024    K 4.3 03/04/2024     03/04/2024    CO2 29.0 03/04/2024    BUN 11 03/04/2024    CREATSERUM 0.88 03/04/2024    GLU 90 03/04/2024    CA 9.9 03/04/2024    ALKPHO 65 03/04/2024    ALT 25 03/04/2024    AST 18 03/04/2024    BILT 0.6 03/04/2024    ALB 4.91 03/28/2024    TP 7.3 03/28/2024     Lab Results   Component Value Date/Time    B12 500 03/28/2024 02:51 PM     Lab Results   Component Value Date/Time    FOLIC >24.0 03/28/2024 02:51 PM       Impression and Plan:    38 year old male presenting with mild chronic isolated leukopenia and normal differential, asymptomatic with no other cytopenia or history of recurrent infections or related illness.   Repeat labs in clinic with normal CBC and differential. Work up showed normal B12, folic acid, and SPEP.  HIV, HCV and HBV testing negative. COLLIN screen panel negative.   Flow cytometry showed a small subpopulation of NK cells with decreased CD2 expression(non specific and likely insignificant).  Leukopenia is probably constitutional/benign or normal variant. Patient is asymptomatic which is reassuring.    Recommend continued observation with periodic labs.     Abnormal NK cells on flow cytometry:  Given the small size of this subpopulation, the significant of this finding is unclear, and this may represent a small transient clone vs a reactive population.   Flow showed no evidence of an atypical or monoclonal B-cell population, phenotypically aberrant T-cell population or increased blast population.     Labs today with mildly decreased WBC and normal differential. Repeat flow cytometry pending. Will call him with the result and follow up plan.           Tonia Dasilva MD  Hematology Oncology

## 2024-09-26 LAB
CD10 CELLS NFR SPEC: <1 %
CD10/CD19: <1 %
CD19 CELLS NFR SPEC: 8 %
CD19+/CD200+: 5 %
CD2 CELLS NFR SPEC: 84 %
CD20 CELLS NFR SPEC: 8 %
CD200 CELLS: 8 %
CD3 CELLS NFR SPEC: 68 %
CD3+/TCRGD+: 2 %
CD3+CD4+ CELLS NFR SPEC: 42 %
CD3+CD4+ CELLS/CD3+CD8+ CLL SPEC: 1.7
CD3+CD8+ CELLS NFR SPEC: 25 %
CD3-/CD56+: 22 %
CD33 CELLS NFR SPEC: 0 %
CD33+/CD34+ MDS CELS: 0 %
CD34 CELLS NFR SPEC: 0 %
CD34 CELLS NFR SPEC: <1 %
CD38 CELLS NFR SPEC: 7 %
CD38+/CD19+: <1 %
CD45 CELLS NFR SPEC: 100 %
CD5 CELLS NFR SPEC: 68 %
CD5/CD19 CELLS: 1 %
CD7 CELLS NFR SPEC: 85 %
CELL SURF KAPPA/LAMBDA RATIO: 1.7
CELL SURF LAMBDA LIGHT CHAIN: 3 %
CELL SURFACE KAPPA LIGHT CHAIN: 5 %
TCR G-D CELLS NFR SPEC: 3 %

## 2025-02-07 ENCOUNTER — LAB ENCOUNTER (OUTPATIENT)
Dept: LAB | Age: 40
End: 2025-02-07
Attending: STUDENT IN AN ORGANIZED HEALTH CARE EDUCATION/TRAINING PROGRAM
Payer: COMMERCIAL

## 2025-02-07 ENCOUNTER — OFFICE VISIT (OUTPATIENT)
Dept: FAMILY MEDICINE CLINIC | Facility: CLINIC | Age: 40
End: 2025-02-07
Payer: COMMERCIAL

## 2025-02-07 VITALS
HEART RATE: 60 BPM | BODY MASS INDEX: 23.97 KG/M2 | DIASTOLIC BLOOD PRESSURE: 73 MMHG | SYSTOLIC BLOOD PRESSURE: 107 MMHG | WEIGHT: 171.19 LBS | HEIGHT: 70.87 IN

## 2025-02-07 DIAGNOSIS — Z00.00 WELL ADULT EXAM: Primary | ICD-10-CM

## 2025-02-07 DIAGNOSIS — D72.819 LEUKOPENIA, UNSPECIFIED TYPE: ICD-10-CM

## 2025-02-07 DIAGNOSIS — Z00.00 WELL ADULT EXAM: ICD-10-CM

## 2025-02-07 DIAGNOSIS — R07.89 ATYPICAL CHEST PAIN: ICD-10-CM

## 2025-02-07 LAB
ALBUMIN SERPL-MCNC: 4.7 G/DL (ref 3.2–4.8)
ALBUMIN/GLOB SERPL: 2 {RATIO} (ref 1–2)
ALP LIVER SERPL-CCNC: 55 U/L
ALT SERPL-CCNC: 40 U/L
ANION GAP SERPL CALC-SCNC: 7 MMOL/L (ref 0–18)
AST SERPL-CCNC: 22 U/L (ref ?–34)
ATRIAL RATE: 58 BPM
BASOPHILS # BLD AUTO: 0.02 X10(3) UL (ref 0–0.2)
BASOPHILS NFR BLD AUTO: 0.3 %
BILIRUB SERPL-MCNC: 1 MG/DL (ref 0.3–1.2)
BILIRUB UR QL: NEGATIVE
BUN BLD-MCNC: 14 MG/DL (ref 9–23)
BUN/CREAT SERPL: 14.4 (ref 10–20)
CALCIUM BLD-MCNC: 9.3 MG/DL (ref 8.7–10.4)
CHLORIDE SERPL-SCNC: 104 MMOL/L (ref 98–112)
CHOLEST SERPL-MCNC: 223 MG/DL (ref ?–200)
CLARITY UR: CLEAR
CO2 SERPL-SCNC: 28 MMOL/L (ref 21–32)
COLOR UR: COLORLESS
CREAT BLD-MCNC: 0.97 MG/DL
DEPRECATED RDW RBC AUTO: 37.8 FL (ref 35.1–46.3)
EGFRCR SERPLBLD CKD-EPI 2021: 103 ML/MIN/1.73M2 (ref 60–?)
EOSINOPHIL # BLD AUTO: 0.03 X10(3) UL (ref 0–0.7)
EOSINOPHIL NFR BLD AUTO: 0.5 %
ERYTHROCYTE [DISTWIDTH] IN BLOOD BY AUTOMATED COUNT: 11.8 % (ref 11–15)
EST. AVERAGE GLUCOSE BLD GHB EST-MCNC: 94 MG/DL (ref 68–126)
FASTING PATIENT LIPID ANSWER: YES
FASTING STATUS PATIENT QL REPORTED: YES
GLOBULIN PLAS-MCNC: 2.3 G/DL (ref 2–3.5)
GLUCOSE BLD-MCNC: 85 MG/DL (ref 70–99)
GLUCOSE UR-MCNC: NORMAL MG/DL
HBA1C MFR BLD: 4.9 % (ref ?–5.7)
HCT VFR BLD AUTO: 42 %
HDLC SERPL-MCNC: 50 MG/DL (ref 40–59)
HGB BLD-MCNC: 15.1 G/DL
HGB UR QL STRIP.AUTO: NEGATIVE
IMM GRANULOCYTES # BLD AUTO: 0.01 X10(3) UL (ref 0–1)
IMM GRANULOCYTES NFR BLD: 0.2 %
KETONES UR-MCNC: NEGATIVE MG/DL
LDLC SERPL CALC-MCNC: 166 MG/DL (ref ?–100)
LEUKOCYTE ESTERASE UR QL STRIP.AUTO: NEGATIVE
LYMPHOCYTES # BLD AUTO: 1.24 X10(3) UL (ref 1–4)
LYMPHOCYTES NFR BLD AUTO: 21.5 %
MCH RBC QN AUTO: 31.5 PG (ref 26–34)
MCHC RBC AUTO-ENTMCNC: 36 G/DL (ref 31–37)
MCV RBC AUTO: 87.7 FL
MONOCYTES # BLD AUTO: 0.36 X10(3) UL (ref 0.1–1)
MONOCYTES NFR BLD AUTO: 6.2 %
NEUTROPHILS # BLD AUTO: 4.12 X10 (3) UL (ref 1.5–7.7)
NEUTROPHILS # BLD AUTO: 4.12 X10(3) UL (ref 1.5–7.7)
NEUTROPHILS NFR BLD AUTO: 71.3 %
NITRITE UR QL STRIP.AUTO: NEGATIVE
NONHDLC SERPL-MCNC: 173 MG/DL (ref ?–130)
OSMOLALITY SERPL CALC.SUM OF ELEC: 288 MOSM/KG (ref 275–295)
P AXIS: 37 DEGREES
P-R INTERVAL: 148 MS
PH UR: 7 [PH] (ref 5–8)
PLATELET # BLD AUTO: 173 10(3)UL (ref 150–450)
POTASSIUM SERPL-SCNC: 4.4 MMOL/L (ref 3.5–5.1)
PROT SERPL-MCNC: 7 G/DL (ref 5.7–8.2)
PROT UR-MCNC: NEGATIVE MG/DL
Q-T INTERVAL: 396 MS
QRS DURATION: 110 MS
QTC CALCULATION (BEZET): 388 MS
R AXIS: 84 DEGREES
RBC # BLD AUTO: 4.79 X10(6)UL
SODIUM SERPL-SCNC: 139 MMOL/L (ref 136–145)
SP GR UR STRIP: 1.01 (ref 1–1.03)
T AXIS: 64 DEGREES
TRIGL SERPL-MCNC: 42 MG/DL (ref 30–149)
TSI SER-ACNC: 0.86 UIU/ML (ref 0.55–4.78)
UROBILINOGEN UR STRIP-ACNC: NORMAL
VENTRICULAR RATE: 58 BPM
VIT D+METAB SERPL-MCNC: 23.5 NG/ML (ref 30–100)
VLDLC SERPL CALC-MCNC: 8 MG/DL (ref 0–30)
WBC # BLD AUTO: 5.8 X10(3) UL (ref 4–11)

## 2025-02-07 PROCEDURE — 84443 ASSAY THYROID STIM HORMONE: CPT

## 2025-02-07 PROCEDURE — 80061 LIPID PANEL: CPT

## 2025-02-07 PROCEDURE — 93005 ELECTROCARDIOGRAM TRACING: CPT

## 2025-02-07 PROCEDURE — 82306 VITAMIN D 25 HYDROXY: CPT

## 2025-02-07 PROCEDURE — 83036 HEMOGLOBIN GLYCOSYLATED A1C: CPT

## 2025-02-07 PROCEDURE — 36415 COLL VENOUS BLD VENIPUNCTURE: CPT

## 2025-02-07 PROCEDURE — 85025 COMPLETE CBC W/AUTO DIFF WBC: CPT

## 2025-02-07 PROCEDURE — 93010 ELECTROCARDIOGRAM REPORT: CPT | Performed by: INTERNAL MEDICINE

## 2025-02-07 PROCEDURE — 80053 COMPREHEN METABOLIC PANEL: CPT

## 2025-02-07 PROCEDURE — 81003 URINALYSIS AUTO W/O SCOPE: CPT

## 2025-02-07 NOTE — PROGRESS NOTES
HPI:    Patient ID: Alvarado Peterson is a 39 year old male.    HPI  Pt presenting for routine physical exam. No significant chronic medical problems. Past medical/surgical history, family history, and social history were reviewed.     Reports knee clicking  Associated mild discomfort  Denies laxity, trauma, swelling    Reports atypical check pain for the last month  Left sided pressure sensation  Random, intermittent  Denies dyspnea, dizziness, paresthesias  Quit smoking before new year    Mood stable, denies SH/SI/HI      Review of Systems   A comprehensive 10 point review of systems was completed.  Pertinent positives and negatives noted in the the HPI.       Current Outpatient Medications   Medication Sig Dispense Refill    clobetasol 0.05 % External Cream Apply twice daily to affected areas on right thigh Monday-Friday. Take weekends off. 30 g 2     Allergies:Allergies[1]   Vitals:    02/07/25 1128   BP: 107/73   Pulse: 60   Weight: 171 lb 3.2 oz (77.7 kg)   Height: 5' 10.87\" (1.8 m)       Body mass index is 23.97 kg/m².   PHYSICAL EXAM:   Physical Exam  Vitals reviewed.   Constitutional:       General: He is not in acute distress.     Appearance: Normal appearance.   HENT:      Head: Normocephalic and atraumatic.      Right Ear: Tympanic membrane, ear canal and external ear normal.      Left Ear: Tympanic membrane, ear canal and external ear normal.   Eyes:      Conjunctiva/sclera: Conjunctivae normal.   Cardiovascular:      Rate and Rhythm: Normal rate and regular rhythm.      Heart sounds: Normal heart sounds, S1 normal and S2 normal. No murmur heard.  Pulmonary:      Effort: Pulmonary effort is normal. No respiratory distress.      Breath sounds: Normal breath sounds. No wheezing, rhonchi or rales.   Abdominal:      General: Bowel sounds are normal.      Palpations: Abdomen is soft.      Tenderness: There is no abdominal tenderness. There is no guarding or rebound.   Musculoskeletal:      Cervical back: Normal  range of motion and neck supple. No muscular tenderness.      Right lower leg: No edema.      Left lower leg: No edema.   Lymphadenopathy:      Cervical: No cervical adenopathy.   Skin:     General: Skin is warm.      Coloration: Skin is not jaundiced.   Neurological:      General: No focal deficit present.      Mental Status: He is alert and oriented to person, place, and time. Mental status is at baseline.   Psychiatric:         Attention and Perception: Attention normal.         Mood and Affect: Mood normal.         Behavior: Behavior normal. Behavior is cooperative.         Cognition and Memory: Cognition normal.             ASSESSMENT/PLAN:   1. Well adult exam  Discussed preventative screenings  - will check labs as below  - encouraged to continue diet/exercise for overall wellness  - follow-up with eye doctor annually  - follow-up with dentist every 6 months  - return yearly for physicals  - annual flu shot -- deferred  - tetanus booster every 10yrs  - CBC With Differential With Platelet; Future  - TSH W Reflex To Free T4; Future  - Comp Metabolic Panel (14); Future  - Hemoglobin A1C; Future  - Lipid Panel; Future  - Vitamin D; Future  - Urinalysis, Routine; Future  - EKG 12 Lead to be performed at [facility]; Future    2. Atypical chest pain  Will check EKG  Continue to monitor  Consider additional testing based on clinical status  - EKG 12 Lead to be performed at [facility]; Future    Pt verbalized understanding and agrees with plan.    Orders Placed This Encounter   Procedures    CBC With Differential With Platelet    TSH W Reflex To Free T4    Comp Metabolic Panel (14)    Hemoglobin A1C    Lipid Panel    Vitamin D    Urinalysis, Routine       Meds This Visit:  Requested Prescriptions      No prescriptions requested or ordered in this encounter       Imaging & Referrals:  None         ID#[id number]         [1] No Known Allergies

## 2025-02-21 ENCOUNTER — PATIENT MESSAGE (OUTPATIENT)
Dept: FAMILY MEDICINE CLINIC | Facility: CLINIC | Age: 40
End: 2025-02-21

## 2025-02-21 DIAGNOSIS — R53.83 FATIGUE, UNSPECIFIED TYPE: Primary | ICD-10-CM

## 2025-02-22 ENCOUNTER — LAB ENCOUNTER (OUTPATIENT)
Dept: LAB | Age: 40
End: 2025-02-22
Attending: STUDENT IN AN ORGANIZED HEALTH CARE EDUCATION/TRAINING PROGRAM
Payer: COMMERCIAL

## 2025-02-22 DIAGNOSIS — R79.89 HIGH SERUM ESTRADIOL: Primary | ICD-10-CM

## 2025-02-22 DIAGNOSIS — R53.83 FATIGUE, UNSPECIFIED TYPE: ICD-10-CM

## 2025-02-22 LAB — ESTRADIOL SERPL-MCNC: 41.1 PG/ML

## 2025-02-22 PROCEDURE — 82670 ASSAY OF TOTAL ESTRADIOL: CPT

## 2025-02-22 PROCEDURE — 36415 COLL VENOUS BLD VENIPUNCTURE: CPT

## 2025-02-22 PROCEDURE — 84410 TESTOSTERONE BIOAVAILABLE: CPT

## 2025-03-02 LAB
SEX HORM BIND GLOB: 51.8 NMOL/L
TESTOST % FREE+WEAK BND: 17.6 %
TESTOST FREE+WEAK BND: 126.6 NG/DL
TESTOSTERONE TOT /MS: 719.4 NG/DL